# Patient Record
Sex: MALE | Race: WHITE
[De-identification: names, ages, dates, MRNs, and addresses within clinical notes are randomized per-mention and may not be internally consistent; named-entity substitution may affect disease eponyms.]

---

## 2018-10-22 ENCOUNTER — HOSPITAL ENCOUNTER (OUTPATIENT)
Dept: HOSPITAL 76 - SC | Age: 43
Discharge: HOME | End: 2018-10-22
Attending: INTERNAL MEDICINE
Payer: COMMERCIAL

## 2018-10-22 DIAGNOSIS — G47.33: Primary | ICD-10-CM

## 2018-10-22 PROCEDURE — 99212 OFFICE O/P EST SF 10 MIN: CPT

## 2018-10-22 PROCEDURE — 99203 OFFICE O/P NEW LOW 30 MIN: CPT

## 2018-11-18 ENCOUNTER — HOSPITAL ENCOUNTER (OUTPATIENT)
Dept: HOSPITAL 76 - SC | Age: 43
Discharge: HOME | End: 2018-11-18
Attending: INTERNAL MEDICINE
Payer: COMMERCIAL

## 2018-11-18 DIAGNOSIS — G47.33: Primary | ICD-10-CM

## 2018-11-18 PROCEDURE — 95811 POLYSOM 6/>YRS CPAP 4/> PARM: CPT

## 2018-12-31 ENCOUNTER — HOSPITAL ENCOUNTER (OUTPATIENT)
Dept: HOSPITAL 76 - SC | Age: 43
Discharge: HOME | End: 2018-12-31
Attending: INTERNAL MEDICINE
Payer: COMMERCIAL

## 2018-12-31 DIAGNOSIS — G47.33: Primary | ICD-10-CM

## 2018-12-31 PROCEDURE — 99212 OFFICE O/P EST SF 10 MIN: CPT

## 2018-12-31 PROCEDURE — 99213 OFFICE O/P EST LOW 20 MIN: CPT

## 2019-03-11 ENCOUNTER — HOSPITAL ENCOUNTER (OUTPATIENT)
Dept: HOSPITAL 76 - SC | Age: 44
Discharge: HOME | End: 2019-03-11
Attending: INTERNAL MEDICINE
Payer: COMMERCIAL

## 2019-03-11 DIAGNOSIS — G47.33: Primary | ICD-10-CM

## 2019-03-11 PROCEDURE — 99212 OFFICE O/P EST SF 10 MIN: CPT

## 2019-03-11 PROCEDURE — 99213 OFFICE O/P EST LOW 20 MIN: CPT

## 2020-06-11 ENCOUNTER — HOSPITAL ENCOUNTER (OUTPATIENT)
Dept: HOSPITAL 76 - SC | Age: 45
Discharge: HOME | End: 2020-06-11
Attending: NURSE PRACTITIONER
Payer: COMMERCIAL

## 2020-06-11 VITALS — DIASTOLIC BLOOD PRESSURE: 74 MMHG | SYSTOLIC BLOOD PRESSURE: 130 MMHG

## 2020-06-11 DIAGNOSIS — G47.33: Primary | ICD-10-CM

## 2020-06-11 DIAGNOSIS — E66.9: ICD-10-CM

## 2020-06-11 PROCEDURE — 99212 OFFICE O/P EST SF 10 MIN: CPT

## 2020-06-11 PROCEDURE — 99214 OFFICE O/P EST MOD 30 MIN: CPT

## 2020-06-11 NOTE — SLEEP CARE CONSULTATION
Information from patient questionnaire entered by Liss Campos.





I have reviewed and concur with the information entered by Liss Campos. 

This document represents the service I personally performed and the decisions 

made by me, Aishwarya Tatum, RN, MSN, ARNP.





History of Present Illness


Service Date and Time: 06/11/2020    1346


Previous diagnosis: Very Severe, Obstructive Sleep Apnea-Hypopnea Syndrome


AHI: 79.6 (in 2018)


Reason for follow up: annual (last seen 2019)


Equipment type: CPAP


Equipment obtained from: [facility] (getting supplies as needed)


Mask style: Nasal


Backup mask available: Yes (old mask )


Last cushion change: few weeks ago 


Year and Where: 2018 - [facility]


Type of Sleep Study: Polysomnography





CPAP Compliance Data





- Data Reviewed with Patient


Average duration of nightly device use: 6.5


Compliance rate %: 73 (180 days)


Current pressure setting (cmH2O): 8-12


Humidity setting: auto


Heated hose setting: auto


Average residual AHI: 1.6


Average large leak: minimal 





Subjective


Missed days of use due to: reports: other (ran out of distilled water and took a

while to replace)


Patient concerns: denies: aerophagia, mask discomfort, air blowing in eyes, mask

leak noise, condensation in mask/hose, nasal congestion, dry mouth, nose, 

throat, epistaxis, other


Observed to snore while using device: No


Current pressure setting perceived as: comfortable


On therapy, patient: reports: sleeping better, awakening more refreshed, being 

more awake and alert during the day, more rested overall.  denies: drowsiness 

while driving


Initial [place] Sleepiness Scale score: 18 (in 2018)


Current [place] Sleepiness Scale score: 7





Allergies and Home Medications


Home medication list reviewed: No (stated no changes)


Allergy and home medication list: 


Metformin 500mg bid 


cyclobenzaprine 10mg prn - uses rarely for back muscle spasms 








Review of Systems


Review of systems same as previous: Yes





Physical Exam


Blood Pressure: 130/74


Cuff size: long


Heart Rate: 88


O2 Saturation: 97


Height: 5 ft 10.5 in


Weight: 220 lb


Body Mass Index: 31.1


BMI Classification: Obese





Impression and Plan





1. Obstructive Sleep Apnea-Hypopnea Syndrome, very severe, with good  treatment 

compliance and good  apnea control. On CPAP therapy, the patient has better 

sleep quality and is more rested overall. The ramp made him feel air hunger so 

ramp disabled. I also discussed how ramp can be adjusted if needed in future. 

Patient has gained  a little weight. Currently patients BMI is 31 obesity class

. Obesity increases the risk of apnea, CPAP pressure requirements and overall 

health risks especially cardiovascular and diabetes. Thus patient is advised to 

continue to  lose weight. Weight loss can be done with reducing portion size, 

reducing refined foods and balancing content with vegetables, fruit and protein.

In addition tracking food intake will allow awareness of how to modify diet to 

achieve weight loss goals. Also eating more slowly will allow more awareness of 

food intake and enjoyment of food while assisting patient to modify intake at 

each meal. A diet consultation can be helpful in achieving optimal weight loss 

goals. The BMI chart was reviewed.  Patient encouraged to discuss their weight 

loss goals with their PCP and consider a referral to a dietician. The patient's 

CPAP pressure range should accommodate some weight loss. Symptoms to report for 

additional pressure adjustment discussed.Patient's apnea severity and rationale 

for treatment to reduce apnea, improve sleep quality and reduce cardiovascular 

and cerebrovascular events was reviewed. He is encouraged to use CPAP with all 

sleep for maximum benefit of treatment. So if no distilled water , he can turn 

off humidity and use CPAP. Patient states since earlier this year he has made 

sure he has a back up of distilled water. He reports a possible transfer next 

year so advised to make appointment before transfer date. He is to ask  [facility] [place] 

if able  to send equipment to his new transfer site, if not he switch to a DME 

who can. 


* Continue auto  CPAP pressure at [phone]


* Update supplies 


* Notify me if snoring with mask or feeling that the pressure is too much or too

  little


* Attempt to lose weight


* Call this office if any problems using CPAP


* Return for follow up in  1 year , or sooner if concerns arise





Visit Type: In Office


Time Spent with Patient (minutes): 25


Provider Statement: I spent 100% of the Face to Face Visit with the patient with

greater than 50% spent counseling the patient and coordination of care.

## 2021-06-14 ENCOUNTER — HOSPITAL ENCOUNTER (OUTPATIENT)
Dept: HOSPITAL 76 - SC | Age: 46
Discharge: HOME | End: 2021-06-14
Attending: INTERNAL MEDICINE
Payer: COMMERCIAL

## 2021-06-14 DIAGNOSIS — G47.33: Primary | ICD-10-CM

## 2021-06-14 DIAGNOSIS — E66.9: ICD-10-CM

## 2021-06-14 PROCEDURE — 99212 OFFICE O/P EST SF 10 MIN: CPT

## 2021-06-14 NOTE — SLEEP CARE CONSULTATION
Information from patient questionnaire entered by Liss Campos.





I have reviewed and concur with the information entered by Liss Campos. 

This document represents the service I personally performed and the decisions 

made by me, Juan Germain MD, [facility].





History of Present Illness


Service Date and Time: 2021    1444


Previous diagnosis: Very Severe, Obstructive Sleep Apnea-Hypopnea Syndrome


AHI: 79.6 (in 2018)


Reason for follow up: annual (last seen 2020)


Equipment type: CPAP


[facility]


Mask style: Nasal


Prior sleep studies: Yes


Year and Where: 2018 - [facility]


HPI additional information: 


HPI:  Mr. Rosado returned today for follow up of nasal CPAP therapy.  He was 

diagnosed to have very severe obstructive sleep apnea-hypopnea syndrome.  The 

patient went to [facility] [place] for the equipment and was fitted with a nasal mask.  He 

reports using the device nightly and all through the night. The compliance 

report shows usage in 178 nights out of the past 180 nights, averaging 8.1 hours

a night.  The > 4 hour compliance rate for the past 30 days is 97%.  He 

complained of no particular problem with the device such as soreness on the 

face, dry nose, epistaxis, nasal congestion or headache.  He thinks that the 

pressure of 8 - 12 cmH2O is comfortable.  On the CPAP therapy he notices 

improvement in his sleep quality, and that he wakes up feeling fresher in the 

morning and more awake/alert during the day.  The [place] Sleepiness Scale score

5.  His wife notices no snore at all.  The average residual AHI is 1.1;; and air

leak, 4.5 L/min.  The 90th percentile pressure is 10.2 cmH2O.








CPAP Compliance Data





- Data Reviewed with Patient


Average duration of nightly device use: 8 hr 3 min


Compliance rate %: 97 (180 days)


Current pressure setting (cmH2O): 8-12


Humidity settin


Average residual AHI: 1.1





Subjective


Missed days of use due to: reports: travel, other (power outage)


Patient concerns: reports: nasal congestion (but probably unrelated), dry mouth,

nose, throat (mouth)


Initial [place] Sleepiness Scale score: 18 (in 2018)


Current [place] Sleepiness Scale score: 5





Allergies and Home Medications


Drug allergies reviewed: Yes


Home medication list reviewed: Yes





Review of Systems


Review of systems same as previous: Yes





Physical Exam


Height: 5 ft 10.5 in


Weight: 228 lb


Body Mass Index: 32.2


BMI Classification: Obese





Impression and Plan


IMPRESSION: 1. Obstructive Sleep Apnea-Hypopnea Syndrome, very severe, with the 

patient doing well on nasal CPAP therapy.  He has excellent compliance and 

significant clinical improvement.  The current pressure appears effective and 

comfortable.  Overall, he is very satisfied with treatment and plans to continue

with it long-term.  No adjustment is necessary today.





PLAN:     1.   Continue with autoCPAP set at 8 - 12 cmH2O.


2.   Try to lose weight 


3.   Try ResMed N30i mask 


4.   Return in one year for follow up or earlier if there is any problem with 

the treatment. 





Follow up recommended for: Weight management


Visit Type: In Office


Time Spent with Patient (minutes): 15


Provider Statement: I spent 100% of the Face to Face Visit with the patient with

greater than 50% spent counseling the patient and coordination of care.